# Patient Record
Sex: MALE | Employment: UNEMPLOYED | URBAN - METROPOLITAN AREA
[De-identification: names, ages, dates, MRNs, and addresses within clinical notes are randomized per-mention and may not be internally consistent; named-entity substitution may affect disease eponyms.]

---

## 2021-01-19 ENCOUNTER — HOSPITAL ENCOUNTER (EMERGENCY)
Age: 21
Discharge: LEFT BEFORE DISCHARGE | End: 2021-01-19
Payer: COMMERCIAL

## 2024-05-03 ENCOUNTER — OFFICE VISIT (OUTPATIENT)
Dept: URGENT CARE | Age: 24
End: 2024-05-03

## 2024-05-03 VITALS
OXYGEN SATURATION: 98 % | DIASTOLIC BLOOD PRESSURE: 70 MMHG | SYSTOLIC BLOOD PRESSURE: 118 MMHG | HEART RATE: 75 BPM | TEMPERATURE: 98 F | WEIGHT: 217 LBS

## 2024-05-03 DIAGNOSIS — Z11.3 SCREENING EXAMINATION FOR STI: Primary | ICD-10-CM

## 2024-05-03 PROBLEM — F29 PSYCHOSIS (HCC): Status: ACTIVE | Noted: 2021-01-26

## 2024-05-03 PROBLEM — F31.2 BIPOLAR AFFECTIVE DISORDER, CURRENT EPISODE MANIC WITH PSYCHOTIC SYMPTOMS (HCC): Chronic | Status: ACTIVE | Noted: 2022-06-13

## 2024-05-03 RX ORDER — ARIPIPRAZOLE LAUROXIL 882 MG/3.2ML
882 INJECTION, SUSPENSION, EXTENDED RELEASE INTRAMUSCULAR
COMMUNITY
Start: 2022-07-15

## 2024-05-03 NOTE — PROGRESS NOTES
Exam  Vitals reviewed.   Constitutional:       Appearance: Normal appearance.   HENT:      Head: Normocephalic and atraumatic.      Right Ear: External ear normal.      Left Ear: External ear normal.      Nose: Nose normal.   Eyes:      Extraocular Movements: Extraocular movements intact.      Conjunctiva/sclera: Conjunctivae normal.      Pupils: Pupils are equal, round, and reactive to light.   Cardiovascular:      Rate and Rhythm: Normal rate and regular rhythm.      Heart sounds: Normal heart sounds.   Pulmonary:      Effort: Pulmonary effort is normal.      Breath sounds: Normal breath sounds.   Genitourinary:     Comments: Defers  Musculoskeletal:      Cervical back: Normal range of motion and neck supple.   Skin:     General: Skin is warm and dry.   Neurological:      Mental Status: He is alert and oriented to person, place, and time.   Psychiatric:         Attention and Perception: Attention normal.         Behavior: Behavior is cooperative.       PROCEDURES:  Unless otherwise noted below, none     Procedures  RESULTS:  No results found for this visit on 05/03/24.  An electronic signature was used to authenticate this note.    --Montana Hernandez PA-C

## 2024-05-03 NOTE — PATIENT INSTRUCTIONS
Urine and blood were taken today to test for Sexually Transmitted infections.  Your Chlamydia and Gonorrhea test results should be back within 1-2 days and you will be contacted at the confidential number you gave us today.  We will also provide prescriptions or resources for any further testing or treatments necessary based on your results.  If any of the results come back positive you should contact all of your current and recent sexual partners so that they can get tested and treated.  Use condoms with all sexual partners!

## 2024-05-04 LAB
HIV 1+2 AB+HIV1 P24 AG SERPL QL IA: NORMAL
HIV 2 AB SERPL QL IA: NORMAL
HIV1 AB SERPL QL IA: NORMAL
HIV1 P24 AG SERPL QL IA: NORMAL
REAGIN+T PALLIDUM IGG+IGM SERPL-IMP: NORMAL
SPECIMEN TYPE: NORMAL
TRICHOMONAS VAGINALIS SCREEN: NEGATIVE

## 2024-05-06 LAB
C TRACH DNA UR QL NAA+PROBE: NEGATIVE
HSV1+2 IGG SER IA-ACNC: 10.9 IV
HSV1+2 IGM SER IA-ACNC: 0.4 IV
N GONORRHOEA DNA UR QL NAA+PROBE: NEGATIVE